# Patient Record
Sex: MALE | Race: ASIAN | Employment: FULL TIME | URBAN - METROPOLITAN AREA
[De-identification: names, ages, dates, MRNs, and addresses within clinical notes are randomized per-mention and may not be internally consistent; named-entity substitution may affect disease eponyms.]

---

## 2021-01-12 ENCOUNTER — OFFICE VISIT (OUTPATIENT)
Dept: PEDIATRICS | Facility: CLINIC | Age: 36
End: 2021-01-12
Payer: COMMERCIAL

## 2021-01-12 VITALS
WEIGHT: 157.4 LBS | HEART RATE: 91 BPM | SYSTOLIC BLOOD PRESSURE: 118 MMHG | HEIGHT: 70 IN | OXYGEN SATURATION: 99 % | TEMPERATURE: 98.3 F | DIASTOLIC BLOOD PRESSURE: 64 MMHG | BODY MASS INDEX: 22.54 KG/M2

## 2021-01-12 DIAGNOSIS — M25.521 RIGHT ELBOW PAIN: Primary | ICD-10-CM

## 2021-01-12 PROCEDURE — 99203 OFFICE O/P NEW LOW 30 MIN: CPT | Performed by: NURSE PRACTITIONER

## 2021-01-12 ASSESSMENT — MIFFLIN-ST. JEOR: SCORE: 1660.21

## 2021-01-12 NOTE — PROGRESS NOTES
Assessment & Plan     Right elbow pain  The xam of the elbow was completely normal with the exception of some pain with full elbow extension.  Given the nature of his fall and injury I am not suspicious of any fractures at this time.  Therefore we did not do an x-ray.  It has been 10 days since the injury.  His pain has improved since then.  Encouraged to continue over-the-counter NSAIDs and ice as needed, and if no improvement within the next 10 days could consider a referral to PT.    He does have a primary care provider with the [facility] and does not want to transfer care to this site.               Return in about 6 months (around 7/12/2021) for Annual Preventative Exam.    Maite Mosqueda, [facility]  [facility] GARRY Farah is a 35 year old who presents to clinic today for the following health issues     HPI       New Patient/Transfer of Care  Musculoskeletal problem/pain  Onset/Duration: x 1 week   Description  Location: elbow - right  Joint Swelling: no  Redness: no  Pain: YES  Warmth: no  Intensity:  moderate  Progression of Symptoms:  same  Accompanying signs and symptoms:   Fevers: no  Numbness/tingling/weakness: no  History  Trauma to the area: YES fell outside   Recent illness:  no  Previous similar problem: no  Previous evaluation:  no  Precipitating or alleviating factors:  Aggravating factors include: with movement  Therapies tried and outcome: ice and support wrap    Fell 10 days ago and hyperextended his R arm. Had initial swelling around the elbow, usesed bandages. He continues to have some painwith lifting and use. He is no longer using OTC NSAID.     He has a PCP at [facility].     Review of Systems   Constitutional, HEENT, cardiovascular, pulmonary, gi and gu systems are negative, except as otherwise noted.      Objective    There were no vitals taken for this visit.  There is no height or weight on file to calculate BMI.  Physical Exam   GENERAL:  healthy, alert and no distress  MS: Exam of R elbow shows no redness or bruising.  No swelling is observed.  Range of motion of elbow is fully intact with some pain with full elbow extension.  No pain with palpation of bony areas of the elbow.  Exam of right wrist shows no abnormalities.  Range of motion is within normal limits.  No bruising or swelling or redness noted.  SKIN: no suspicious lesions or rashes  PSYCH: mentation appears normal, affect normal/bright